# Patient Record
Sex: MALE | Race: WHITE | NOT HISPANIC OR LATINO | Employment: FULL TIME | RURAL
[De-identification: names, ages, dates, MRNs, and addresses within clinical notes are randomized per-mention and may not be internally consistent; named-entity substitution may affect disease eponyms.]

---

## 2017-06-05 ENCOUNTER — OFFICE VISIT (OUTPATIENT)
Dept: RETAIL CLINIC | Facility: CLINIC | Age: 22
End: 2017-06-05

## 2017-06-05 DIAGNOSIS — Z13.9 SCREENING: Primary | ICD-10-CM

## 2017-06-05 NOTE — PROGRESS NOTES
Subjective   Jaswant Marquis is a 21 y.o. male.     Reason for Appointment  1. escreen    History of Present Illness  HPI:   See scanned document. See custody control form.    Assessments  1. Encounter for screening, unspecified - Z13.9    This document has been electronically signed by FELIPE Oleary June 5, 2017 4:37 PM

## 2020-01-27 ENCOUNTER — OFFICE VISIT (OUTPATIENT)
Dept: UROLOGY | Facility: CLINIC | Age: 25
End: 2020-01-27

## 2020-01-27 VITALS — BODY MASS INDEX: 38.61 KG/M2 | WEIGHT: 246 LBS | HEIGHT: 67 IN

## 2020-01-27 DIAGNOSIS — N35.911 STRICTURE OF URETHRAL MEATUS IN MALE, UNSPECIFIED STRICTURE TYPE: ICD-10-CM

## 2020-01-27 DIAGNOSIS — N48.0 BALANITIS XEROTICA OBLITERANS: Primary | ICD-10-CM

## 2020-01-27 PROCEDURE — 99202 OFFICE O/P NEW SF 15 MIN: CPT | Performed by: UROLOGY

## 2020-01-27 NOTE — PROGRESS NOTES
Chief Complaint:          Patient has a nonhealing ulcer in the region of the frenulum.    HPI:   24 y.o. male.  Pt states that he has pain with intercourse.  He states the lesion has been present for months.  He denies difficulty in urination.  HPI      Past Medical History:      History reviewed. No pertinent past medical history.      Current Meds:     No current outpatient medications on file.     No current facility-administered medications for this visit.         Allergies:      No Known Allergies     Past Surgical History:     History reviewed. No pertinent surgical history.      Social History:     Social History     Socioeconomic History   • Marital status:      Spouse name: Not on file   • Number of children: Not on file   • Years of education: Not on file   • Highest education level: Not on file   Tobacco Use   • Smoking status: Never Smoker   • Smokeless tobacco: Never Used   Substance and Sexual Activity   • Alcohol use: Yes     Comment: sometimes   • Drug use: Never       Family History:     History reviewed. No pertinent family history.    Review of Systems:     Review of Systems   Constitutional: Negative for chills, fatigue and fever.   HENT: Positive for congestion and sinus pressure.    Respiratory: Negative for chest tightness and shortness of breath.    Cardiovascular: Negative for chest pain.   Gastrointestinal: Negative for abdominal pain, constipation, diarrhea, nausea and vomiting.   Genitourinary: Negative for flank pain, frequency, hematuria and urgency.   Musculoskeletal: Negative for back pain and neck pain.   Skin: Negative for rash.   Neurological: Negative for dizziness and headaches.   Hematological: Does not bruise/bleed easily.   Psychiatric/Behavioral: The patient is not nervous/anxious.        I have reviewed the follow portions of the patient's history and confirmed they are accurate today:  allergies, current medications, past family history, past medical history, past  "social history, past surgical history, problem list and ROS  Physical Exam:     Physical Exam   Constitutional: He is oriented to person, place, and time.   HENT:   Head: Normocephalic and atraumatic.   Right Ear: External ear normal.   Left Ear: External ear normal.   Nose: Nose normal.   Mouth/Throat: Oropharynx is clear and moist.   Eyes: Pupils are equal, round, and reactive to light. Conjunctivae and EOM are normal.   Neck: Normal range of motion. Neck supple. No thyromegaly present.   Cardiovascular: Normal rate, regular rhythm, normal heart sounds and intact distal pulses.   No murmur heard.  Pulmonary/Chest: Effort normal and breath sounds normal. No respiratory distress. He has no wheezes. He has no rales. He exhibits no tenderness.   Abdominal: Soft. Bowel sounds are normal. He exhibits no distension and no mass. There is no tenderness. No hernia.   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  The patient was circumcised at birth.   Musculoskeletal: Normal range of motion. He exhibits no edema or tenderness.   Lymphadenopathy:     He has no cervical adenopathy.   Neurological: He is alert and oriented to person, place, and time. No cranial nerve deficit. He exhibits normal muscle tone. Coordination normal.   Skin: Skin is warm. No rash noted.   Psychiatric: He has a normal mood and affect. His behavior is normal. Judgment and thought content normal.   Nursing note and vitals reviewed.      Ht 170.2 cm (67\")   Wt 112 kg (246 lb)   BMI 38.53 kg/m²    Procedure:         Assessment:     Encounter Diagnosis   Name Primary?   • Balanitis xerotica obliterans Yes       No orders of the defined types were placed in this encounter.      Patient reports that he is not currently experiencing any symptoms of urinary incontinence.      Plan:   The patient is quite unusual " in that he is circumcised and has changes of balanitis xerotica obliterans.  I would recommend referral to [facility] to see what their recommendations are    Patient's Body mass index is 38.53 kg/m². BMI is above normal parameters. Recommendations include: educational material.      Smoking Cessation Counseling:  Never a smoker.  Patient does not currently use any tobacco products.     Counseling was given to patient for the following topics impressions as follows: Balanitis xerotica obliterans in a patient who is been circumcised. The interim medical history and current results were reviewed.  A treatment plan with follow-up was made for Balanitis xerotica obliterans [N48.0].   This document has been electronically signed by Damon Kennedy MD January 27, 2020 9:28 AM

## 2021-03-18 ENCOUNTER — BULK ORDERING (OUTPATIENT)
Dept: CASE MANAGEMENT | Facility: OTHER | Age: 26
End: 2021-03-18

## 2021-03-18 DIAGNOSIS — Z23 IMMUNIZATION DUE: ICD-10-CM

## 2022-05-20 ENCOUNTER — OFFICE VISIT (OUTPATIENT)
Dept: UROLOGY | Facility: CLINIC | Age: 27
End: 2022-05-20

## 2022-05-20 VITALS — HEIGHT: 67 IN | WEIGHT: 240 LBS | BODY MASS INDEX: 37.67 KG/M2

## 2022-05-20 DIAGNOSIS — N48.0 BALANITIS XEROTICA OBLITERANS: Primary | ICD-10-CM

## 2022-05-20 LAB
BILIRUB BLD-MCNC: NEGATIVE MG/DL
CLARITY, POC: CLEAR
COLOR UR: YELLOW
EXPIRATION DATE: NORMAL
GLUCOSE UR STRIP-MCNC: NEGATIVE MG/DL
KETONES UR QL: NEGATIVE
LEUKOCYTE EST, POC: NEGATIVE
Lab: NORMAL
NITRITE UR-MCNC: NEGATIVE MG/ML
PH UR: 6 [PH] (ref 5–8)
PROT UR STRIP-MCNC: NEGATIVE MG/DL
RBC # UR STRIP: NEGATIVE /UL
SP GR UR: 1.03 (ref 1–1.03)
UROBILINOGEN UR QL: NORMAL

## 2022-05-20 PROCEDURE — 99214 OFFICE O/P EST MOD 30 MIN: CPT | Performed by: UROLOGY

## 2022-05-20 PROCEDURE — 81003 URINALYSIS AUTO W/O SCOPE: CPT | Performed by: UROLOGY

## 2022-05-20 RX ORDER — CLOTRIMAZOLE AND BETAMETHASONE DIPROPIONATE 10; .64 MG/G; MG/G
1 CREAM TOPICAL 2 TIMES DAILY
Qty: 45 G | Refills: 2 | Status: SHIPPED | OUTPATIENT
Start: 2022-05-20

## 2022-05-20 RX ORDER — CLOTRIMAZOLE AND BETAMETHASONE DIPROPIONATE 10; .64 MG/G; MG/G
1 CREAM TOPICAL 2 TIMES DAILY
Qty: 45 G | Refills: 3 | Status: SHIPPED | OUTPATIENT
Start: 2022-05-20 | End: 2022-05-20 | Stop reason: SDUPTHER

## 2022-05-20 NOTE — PROGRESS NOTES
"     Follow Up Office Visit      Patient Name: Jaswant Marquis  : 1995   MRN: [id number]     Chief Complaint:    Chief Complaint   Patient presents with   • BXO       Referring Provider: No ref. provider found    History of Present Illness: Jaswant Marquis is a 26 y.o. male who presents today for follow up of  BXO.  He saw Dr. Kennedy in the past and was sent to  for his BXO.   He states he is doing well over all.  He was seen at  previously by Dr. Pritchard around  or  and because of COVID he was never rescheduled.  He reports overall he has done well.  He has been on \"magic butt paste\" which he states has worked for him.  He was finally able to make an appointment here today.  He has a follow up scheduled with Dr. Pritchard in .  He is about to go on a mission trip soon and needs medical clearance.    He had a tear approximately 1 month ago and states it healed well with the butt paste.    He is sexually active without any pain.  No issues with erections.  Urinating well.      Subjective      Review of System: Review of Systems   Constitutional: Negative for fatigue.   HENT: Negative for facial swelling and sinus pressure.    Respiratory: Negative for choking and stridor.    Genitourinary: Positive for penile pain. Negative for dysuria and testicular pain.   Musculoskeletal: Negative for back pain.   Neurological: Negative for speech difficulty and headaches.   Psychiatric/Behavioral: Negative for agitation.      I have reviewed the ROS documented by my clinical staff, I have updated appropriately and I agree. Allen Gallardo MD    I have reviewed and the following portions of the patient's history were updated as appropriate: past family history, past medical history, past social history, past surgical history and problem list.    Past Medical History:   Past Medical History:   Diagnosis Date   • BXO (balanitis xerotica obliterans)        Past Surgical History:  History reviewed. No pertinent " "surgical history.    Family History:   family history includes No Known Problems in his father and mother.   Otherwise pertinent FHx was reviewed and not pertinent to current issue.    Social History:    reports that he has never smoked. He has never used smokeless tobacco. He reports current alcohol use. He reports that he does not use drugs.    Medications:     Current Outpatient Medications:   •  clotrimazole-betamethasone (LOTRISONE) 1-0.05 % cream, Apply 1 application topically to the appropriate area as directed 2 (Two) Times a Day., Disp: 45 g, Rfl: 2    Allergies:   No Known Allergies      Objective     Physical Exam:   Vital Signs:   Vitals:    05/20/22 0956   Weight: 109 kg (240 lb)   Height: 170.2 cm (67\")     Body mass index is 37.59 kg/m².     Physical Exam  Vitals and nursing note reviewed.   Constitutional:       General: He is awake. He is not in acute distress.     Appearance: Normal appearance. He is well-developed. He is obese.   HENT:      Head: Normocephalic and atraumatic.      Right Ear: External ear normal.      Left Ear: External ear normal.      Nose: Nose normal.   Eyes:      Conjunctiva/sclera: Conjunctivae normal.   Pulmonary:      Effort: Pulmonary effort is normal.   Abdominal:      General: There is no distension.      Palpations: Abdomen is soft. There is no mass.      Tenderness: There is no abdominal tenderness. There is no right CVA tenderness, left CVA tenderness, guarding or rebound.      Hernia: No hernia is present. There is no hernia in the left inguinal area or right inguinal area.   Genitourinary:     Pubic Area: No rash.       Penis: Normal.       Testes: Normal.      Rectum: No mass or tenderness. Normal anal tone.      Comments: Tight skin around the frenulum.  Circucised.  Small light pink lesion with thin tight skin.    Musculoskeletal:      Cervical back: Normal range of motion.   Lymphadenopathy:      Lower Body: No right inguinal adenopathy. No left inguinal " adenopathy.   Skin:     General: Skin is warm.   Neurological:      General: No focal deficit present.      Mental Status: He is alert and oriented to person, place, and time.   Psychiatric:         Behavior: Behavior normal. Behavior is cooperative.         Labs:   Brief Urine Lab Results  (Last result in the past 365 days)      Color   Clarity   Blood   Leuk Est   Nitrite   Protein   CREAT   Urine HCG        05/20/22 0955 Yellow   Clear   Negative   Negative   Negative   Negative                      No results found for: GLUCOSE, CALCIUM, NA, K, CO2, CL, BUN, CREATININE, EGFRIFAFRI, EGFRIFNONA, BCR, ANIONGAP    No results found for: WBC, HGB, HCT, MCV, PLT    No results found for: PSA    Images:   No Images in the past 120 days found..        Measures:   Tobacco:   Jaswant Marquis  reports that he has never smoked. He has never used smokeless tobacco..    Urine Incontinence: Patient reports that he is not currently experiencing any symptoms of urinary incontinence.         Assessment / Plan      Assessment/Plan:   26 y.o. male who presented today for follow up of what appears to be possible BXO.  His exam today was overall good.  He has done an excellent job with hygiene.  He has lost 40 pounds since being told to lose weight with Dr. Pritchard.  He reports he has run out of his steroid cream. There is a small lesion there with some thin skin.  I will start him on betamethasone cream.  We discussed hygiene again.    He is scheduled to go to [place] on a mission trip.  From my standpoint there are no restrictions for him going on his mission trip.      Diagnoses and all orders for this visit:    1. Balanitis xerotica obliterans (Primary)  -     POC Urinalysis Dipstick, Automated  -     Discontinue: clotrimazole-betamethasone (LOTRISONE) 1-0.05 % cream; Apply 1 application topically to the appropriate area as directed 2 (Two) Times a Day.  Dispense: 45 g; Refill: 3  -     clotrimazole-betamethasone (LOTRISONE) 1-0.05 %  cream; Apply 1 application topically to the appropriate area as directed 2 (Two) Times a Day.  Dispense: 45 g; Refill: 2           Follow Up:   Return in about 6 months (around 11/20/2022).    I spent approximately 30 minutes providing clinical care for this patient; including review of patient's chart and provider documentation, face to face time spent with patient in examination room (obtaining history, performing physical exam, discussing diagnosis and management options), placing orders, and completing patient documentation.     Allen Gallardo MD  [facility]